# Patient Record
Sex: FEMALE | Race: WHITE
[De-identification: names, ages, dates, MRNs, and addresses within clinical notes are randomized per-mention and may not be internally consistent; named-entity substitution may affect disease eponyms.]

---

## 2023-09-02 ENCOUNTER — HOSPITAL ENCOUNTER (OUTPATIENT)
Dept: HOSPITAL 95 - ER | Age: 68
Setting detail: OBSERVATION
LOS: 1 days | Discharge: HOME | End: 2023-09-03
Attending: INTERNAL MEDICINE | Admitting: INTERNAL MEDICINE
Payer: MEDICARE

## 2023-09-02 VITALS — WEIGHT: 175.71 LBS | HEIGHT: 65 IN | BODY MASS INDEX: 29.27 KG/M2

## 2023-09-02 VITALS — DIASTOLIC BLOOD PRESSURE: 79 MMHG | SYSTOLIC BLOOD PRESSURE: 126 MMHG

## 2023-09-02 DIAGNOSIS — Z72.0: ICD-10-CM
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
## 2023-09-02 DIAGNOSIS — K21.9: ICD-10-CM

## 2023-09-02 DIAGNOSIS — S82.851A: Primary | ICD-10-CM

## 2023-09-02 PROCEDURE — A9270 NON-COVERED ITEM OR SERVICE: HCPCS

## 2023-09-02 PROCEDURE — G0378 HOSPITAL OBSERVATION PER HR: HCPCS

## 2023-09-03 VITALS — SYSTOLIC BLOOD PRESSURE: 139 MMHG | DIASTOLIC BLOOD PRESSURE: 86 MMHG

## 2023-09-03 VITALS — DIASTOLIC BLOOD PRESSURE: 71 MMHG | SYSTOLIC BLOOD PRESSURE: 114 MMHG

## 2023-09-03 VITALS — SYSTOLIC BLOOD PRESSURE: 127 MMHG | DIASTOLIC BLOOD PRESSURE: 87 MMHG

## 2023-09-03 NOTE — NUR
SHIFT SUMMARY
PT A&OX4, AND COOPERATIVE WITH CARE. NO ACUTE CHANGES, VSS. MEDICATED FOR PAIN
TWICE. 1-ASSIST TO BSC. SPLINT TO R ANKLE, CAP REFILL <3 SEC. TOLERATING PO
INTAKE. CALLS APPROPRIATELY, CALL LIGHT WITHIN REACH.

## 2023-09-03 NOTE — NUR
DISCHARGED
PT'S FAMILY ARRIVED.  REVIEWED DC INSTRUCTIONS W/PT; VERBALIZED UNDERSTANDING.
DC'D IV, CATHETER INTACT.  PT LEFT UNIT IN WC W/POSSESSIONS AND DC PAPERWORK
IN HAND TO RIDE WAITING OUTSIDE.